# Patient Record
Sex: FEMALE | URBAN - METROPOLITAN AREA
[De-identification: names, ages, dates, MRNs, and addresses within clinical notes are randomized per-mention and may not be internally consistent; named-entity substitution may affect disease eponyms.]

---

## 2023-06-21 ENCOUNTER — APPOINTMENT (OUTPATIENT)
Dept: URBAN - METROPOLITAN AREA CLINIC 246 | Age: 53
Setting detail: DERMATOLOGY
End: 2023-06-21

## 2023-06-21 DIAGNOSIS — L81.4 OTHER MELANIN HYPERPIGMENTATION: ICD-10-CM

## 2023-06-21 DIAGNOSIS — D18.0 HEMANGIOMA: ICD-10-CM

## 2023-06-21 DIAGNOSIS — D22 MELANOCYTIC NEVI: ICD-10-CM

## 2023-06-21 DIAGNOSIS — Z12.83 ENCOUNTER FOR SCREENING FOR MALIGNANT NEOPLASM OF SKIN: ICD-10-CM

## 2023-06-21 DIAGNOSIS — L82.1 OTHER SEBORRHEIC KERATOSIS: ICD-10-CM

## 2023-06-21 PROBLEM — D18.01 HEMANGIOMA OF SKIN AND SUBCUTANEOUS TISSUE: Status: ACTIVE | Noted: 2023-06-21

## 2023-06-21 PROBLEM — D22.5 MELANOCYTIC NEVI OF TRUNK: Status: ACTIVE | Noted: 2023-06-21

## 2023-06-21 PROCEDURE — 99203 OFFICE O/P NEW LOW 30 MIN: CPT

## 2023-06-21 PROCEDURE — OTHER MIPS QUALITY: OTHER

## 2023-06-21 PROCEDURE — OTHER COUNSELING: OTHER

## 2023-06-21 ASSESSMENT — LOCATION SIMPLE DESCRIPTION DERM
LOCATION SIMPLE: ABDOMEN
LOCATION SIMPLE: LEFT CHEEK
LOCATION SIMPLE: CHEST

## 2023-06-21 ASSESSMENT — LOCATION DETAILED DESCRIPTION DERM
LOCATION DETAILED: LOWER STERNUM
LOCATION DETAILED: STERNAL NOTCH
LOCATION DETAILED: MIDDLE STERNUM
LOCATION DETAILED: LEFT MEDIAL MALAR CHEEK
LOCATION DETAILED: SUBXIPHOID

## 2023-06-21 ASSESSMENT — LOCATION ZONE DERM
LOCATION ZONE: TRUNK
LOCATION ZONE: FACE

## 2023-06-21 NOTE — PROCEDURE: MIPS QUALITY
Detail Level: Zone
Quality 226: Preventive Care And Screening: Tobacco Use: Screening And Cessation Intervention: Patient screened for tobacco use and is an ex/non-smoker
Quality 431: Preventive Care And Screening: Unhealthy Alcohol Use - Screening: Patient not identified as an unhealthy alcohol user when screened for unhealthy alcohol use using a systematic screening method
Quality 110: Preventive Care And Screening: Influenza Immunization: Influenza Immunization Administered during Influenza season
Quality 130: Documentation Of Current Medications In The Medical Record: Current Medications Documented

## 2023-09-07 ENCOUNTER — RX ONLY (RX ONLY)
Age: 53
End: 2023-09-07

## 2023-09-07 ENCOUNTER — APPOINTMENT (OUTPATIENT)
Dept: URBAN - METROPOLITAN AREA CLINIC 246 | Age: 53
Setting detail: DERMATOLOGY
End: 2023-09-07

## 2023-09-07 DIAGNOSIS — L81.0 POSTINFLAMMATORY HYPERPIGMENTATION: ICD-10-CM

## 2023-09-07 DIAGNOSIS — L30.9 DERMATITIS, UNSPECIFIED: ICD-10-CM

## 2023-09-07 PROCEDURE — OTHER ADDITIONAL NOTES: OTHER

## 2023-09-07 PROCEDURE — OTHER COUNSELING: OTHER

## 2023-09-07 PROCEDURE — 99213 OFFICE O/P EST LOW 20 MIN: CPT

## 2023-09-07 PROCEDURE — OTHER TREATMENT REGIMEN: OTHER

## 2023-09-07 PROCEDURE — OTHER PRESCRIPTION: OTHER

## 2023-09-07 PROCEDURE — OTHER DIAGNOSIS COMMENT: OTHER

## 2023-09-07 RX ORDER — TRIFAROTENE 50 UG/G
CREAM TOPICAL
Qty: 45 | Refills: 1 | Status: ERX | COMMUNITY
Start: 2023-09-07

## 2023-09-07 RX ORDER — TRIAMCINOLONE ACETONIDE 1 MG/G
CREAM TOPICAL
Qty: 80 | Refills: 1 | Status: ERX | COMMUNITY
Start: 2023-09-07

## 2023-09-07 ASSESSMENT — LOCATION SIMPLE DESCRIPTION DERM: LOCATION SIMPLE: UPPER BACK

## 2023-09-07 ASSESSMENT — LOCATION ZONE DERM: LOCATION ZONE: TRUNK

## 2023-09-07 ASSESSMENT — LOCATION DETAILED DESCRIPTION DERM: LOCATION DETAILED: INFERIOR THORACIC SPINE

## 2023-09-07 NOTE — PROCEDURE: COUNSELING
Detail Level: Zone
Moisturizer Recommendations: Gentle fragrance-free moisturizer such as Cetaphil lotion \\nAquaphor
Cleanser Recommendations: Gentle fragrance-free cleanser such as Cetaphil
Detail Level: Detailed

## 2023-09-07 NOTE — PROCEDURE: ADDITIONAL NOTES
Additional Notes: Patient has been seeking treatment for scarring. She says she is often scratching her back when nervous and that is what her scarring is from. \\n\\nLesions on back and shoulders appear flat today with scattered hypopigmentation. Discussed topical retinoids being helpful for scarring and pigmentary disorders. Discussed consulting with aestheticians in [place] office for recommendations for additional procedures. Additional Notes: Patient has been seeking treatment for scarring. She says she is often scratching her back when nervous and that is what her scarring is from. \\n\\nLesions on back and shoulders appear flat today with scattered hypopigmentation. Discussed topical retinoids being helpful for scarring and pigmentary disorders. Discussed consulting with aestheticians in [place] office for recommendations for additional procedures.

## 2023-09-07 NOTE — PROCEDURE: DIAGNOSIS COMMENT
Comment: Possibly secondary to chemical peel done in [place] August 18, 2023.
Detail Level: Simple
Render Risk Assessment In Note?: no

## 2023-09-07 NOTE — HPI: SCAR
How Severe Is Your Scar?: mild
Is This A New Presentation, Or A Follow-Up?: Scar
Additional History: Patient states of scarring on her back on going for years. Patient had laser, chemical peels and micro needling treatment but denies any improvement.

## 2023-09-07 NOTE — PROCEDURE: TREATMENT REGIMEN
Initiate Treatment: Aklief 0.005% cream apply thin layer to back every other night then slowly increase to every night as tolerated.\\n\\nRx sent to [facility] pharmacy with one refill. Initiate Treatment: Aklief 0.005% cream apply thin layer to back every other night then slowly increase to every night as tolerated.\\n\\nRx sent to [facility] pharmacy with one refill.

## 2023-09-07 NOTE — PROCEDURE: TREATMENT REGIMEN
Initiate Treatment: Triamcinolone 0.1% cream apply thin layer to affected areas on back. Apply twice daily x2 weeks then one week off thereafter repeat if area flares.\\n\\nSent to [facility] with one refill in [place], [address]. Initiate Treatment: Triamcinolone 0.1% cream apply thin layer to affected areas on back. Apply twice daily x2 weeks then one week off thereafter repeat if area flares.\\n\\nSent to [facility] with one refill in [place], [address].